# Patient Record
Sex: FEMALE | Race: WHITE | Employment: FULL TIME
[De-identification: names, ages, dates, MRNs, and addresses within clinical notes are randomized per-mention and may not be internally consistent; named-entity substitution may affect disease eponyms.]

---

## 2017-08-22 ENCOUNTER — TELEPHONE (OUTPATIENT)
Dept: CASE MANAGEMENT | Age: 52
End: 2017-08-22

## 2020-03-07 ENCOUNTER — HOSPITAL ENCOUNTER (OUTPATIENT)
Dept: CT IMAGING | Age: 55
Discharge: HOME | End: 2020-03-09
Payer: COMMERCIAL

## 2020-03-07 PROCEDURE — 71250 CT THORAX DX C-: CPT

## 2022-12-13 ENCOUNTER — PREP FOR PROCEDURE (OUTPATIENT)
Dept: SURGERY | Age: 57
End: 2022-12-13

## 2022-12-13 RX ORDER — SODIUM CHLORIDE 9 MG/ML
INJECTION, SOLUTION INTRAVENOUS CONTINUOUS
Status: CANCELLED | OUTPATIENT
Start: 2022-12-13

## 2022-12-13 NOTE — H&P
Date:   [facility]  Name: Lucero Rucker              : 1965 Sex: F  Age: 56 yrs  Acct#:  [id number]          Patient was referred by Zofia Renner DO. Patient's primary care provider is Zofia Renner DO.    CHIEF COMPLAINT: Colonoscopy    HPI:  62-year-old female referred for colon cancer screening. She had a normal scope 5 years ago. Her mother had colon cancer. .  Denies any rectal bleeding or changes in bowel habits. Meds Prior to Visit:  Lisinopril  20 mg   Celecoxib  50 mg  1 by mouth every day     Allergies:  NKDA    PMH:  Health Maintenance:  Colonoscopy - (2018) NO PATH/SW/DR KIM  Reviewed and updated. SURGERIES:[ ]  FH:  Mother:  Colon Cancer. Reviewed and updated. [ ]  SH:  Personal Habits:  Tobacco Use: Patient is a former smoker. Alcohol: Current Alcohol Use; Social.Drug Use: Denies Drug Use. Daily Caffeine: Uses Caffeine. Reviewed and updated. [ ]    Date: 2022  Was the patient queried about smoking behavior? Yes  No  Does the patient currently smoke? Tobacco Use: Patient is a former smoker. [ ]  ROS:  Const: Reports night sweats, but denies anorexia, anxiety, fatigue, weight gain and weight loss. Eyes: Denies eye symptoms. ENMT: Denies ear symptoms. Denies nasal symptoms. Denies mouth or throat symptoms. CV: Denies hypertension and other cardiovascular symptoms. Resp: Denies respiratory symptoms. GI: Denies hepatitis, liver disease and other gastrointestinal symptoms. Musculo: Denies musculoskeletal symptoms. Skin: Denies skin, hair and nail symptoms. Breast: Denies breast problems. Neuro: Denies neurologic symptoms. Psych: Denies depression and substance abuse. Endocrine: Denies diabetes, kidney disease and thyroid disease. Hema/Lymph: Denies anemia, blood disease, cancer and past transfusion. Allergy/Immuno: Denies immunosuppression. Reviewed and updated. [ ]    Ht: 67\" 5'7\" Wt: 187lb BMI: 29.3      CONSTITUTION: Non-toxic, no acute distress[ ]  HEART: Regular rate and rhythm, no murmurs[ ]  LUNGS: Clear to auscultation bilaterally, no wheezes[ ]  ABDOMEN: soft, non-tender, non-distended, no hernias or masses  EXTREMETIES: warm and dry. No rash, cyanosis, edema or jaundice[ ]  [ ]     IMAGING: [ ]    LABS: [ ]        Assessment #1: Hx Z12.11 Encounter for screening for malignant neoplasm of colon   Care Plan:              Comments       :  Colonoscopy scheduled     Assessment #2: Hx Z80.0 Family history of malignant neoplasm of digestive organs   Care Plan:                      Time spent reviewing records, discussing findings, answering questions, reviewing laboratory studies, radiologic exams, and other diagnostics, and reviewing the diagnostic and therapeutic plan: 30 minutes    Voice recognition software was used in the preparation of this document. Despite all efforts to prevent them, transcription errors may have occurred.   Seen by:

## 2023-05-19 ENCOUNTER — HOSPITAL ENCOUNTER (OUTPATIENT)
Dept: CT IMAGING | Age: 58
Discharge: HOME | End: 2023-05-19
Payer: COMMERCIAL

## 2023-05-19 DIAGNOSIS — R91.1 PULMONARY NODULE: ICD-10-CM

## 2023-05-19 PROCEDURE — 71250 CT THORAX DX C-: CPT

## 2023-11-30 ENCOUNTER — HOSPITAL ENCOUNTER (OUTPATIENT)
Dept: CT IMAGING | Age: 58
Discharge: HOME | End: 2023-12-02
Attending: INTERNAL MEDICINE
Payer: COMMERCIAL

## 2023-11-30 DIAGNOSIS — R91.8 PULMONARY NODULES: ICD-10-CM

## 2023-11-30 PROCEDURE — 71250 CT THORAX DX C-: CPT

## 2025-05-19 ENCOUNTER — TRANSCRIBE ORDERS (OUTPATIENT)
Dept: ADMINISTRATIVE | Age: 60
End: 2025-05-19

## 2025-05-19 DIAGNOSIS — R91.1 PULMONARY NODULE: Primary | ICD-10-CM
